# Patient Record
Sex: FEMALE | Race: OTHER | URBAN - METROPOLITAN AREA
[De-identification: names, ages, dates, MRNs, and addresses within clinical notes are randomized per-mention and may not be internally consistent; named-entity substitution may affect disease eponyms.]

---

## 2023-06-16 ENCOUNTER — OFFICE VISIT (OUTPATIENT)
Dept: OTOLARYNGOLOGY | Facility: CLINIC | Age: 83
End: 2023-06-16

## 2023-06-16 VITALS — WEIGHT: 150 LBS | BODY MASS INDEX: 29.45 KG/M2 | HEIGHT: 60 IN

## 2023-06-16 DIAGNOSIS — R05.3 CHRONIC COUGH: Primary | ICD-10-CM

## 2023-06-16 DIAGNOSIS — R49.0 HOARSENESS: ICD-10-CM

## 2023-06-16 DIAGNOSIS — J34.89 NASAL CRUSTING: ICD-10-CM

## 2023-06-16 PROCEDURE — 99203 OFFICE O/P NEW LOW 30 MIN: CPT | Performed by: SPECIALIST

## 2023-06-16 PROCEDURE — 1160F RVW MEDS BY RX/DR IN RCRD: CPT | Performed by: SPECIALIST

## 2023-06-16 PROCEDURE — 3008F BODY MASS INDEX DOCD: CPT | Performed by: SPECIALIST

## 2023-06-16 PROCEDURE — 1159F MED LIST DOCD IN RCRD: CPT | Performed by: SPECIALIST

## 2023-06-16 RX ORDER — ALPRAZOLAM 1 MG/1
1 TABLET ORAL NIGHTLY PRN
COMMUNITY
Start: 2023-05-24

## 2023-06-16 RX ORDER — CEPHALEXIN 500 MG/1
500 CAPSULE ORAL EVERY 8 HOURS
Qty: 30 CAPSULE | Refills: 0 | Status: SHIPPED | OUTPATIENT
Start: 2023-06-16

## 2023-06-16 RX ORDER — ATORVASTATIN CALCIUM 20 MG/1
20 TABLET, FILM COATED ORAL DAILY
COMMUNITY
Start: 2023-01-16

## 2023-06-16 NOTE — PATIENT INSTRUCTIONS
No evidence of laryngeal abnormality on your examination  Please increase water intake for the mucus. Trial of Keflex  Follow-up if symptoms persist  Follow-up with any additional questions or problems.

## 2025-03-04 ENCOUNTER — OFFICE VISIT (OUTPATIENT)
Dept: FAMILY MEDICINE CLINIC | Facility: CLINIC | Age: 85
End: 2025-03-04

## 2025-03-04 VITALS
HEIGHT: 60 IN | TEMPERATURE: 98 F | RESPIRATION RATE: 20 BRPM | OXYGEN SATURATION: 95 % | SYSTOLIC BLOOD PRESSURE: 137 MMHG | DIASTOLIC BLOOD PRESSURE: 72 MMHG | BODY MASS INDEX: 28.86 KG/M2 | HEART RATE: 80 BPM | WEIGHT: 147 LBS

## 2025-03-04 DIAGNOSIS — R73.03 PREDIABETES: Primary | ICD-10-CM

## 2025-03-04 DIAGNOSIS — F41.9 ANXIETY: ICD-10-CM

## 2025-03-04 DIAGNOSIS — M25.562 LEFT KNEE PAIN, UNSPECIFIED CHRONICITY: ICD-10-CM

## 2025-03-04 LAB
CARTRIDGE EXPIRATION DATE: ABNORMAL DATE
HEMOGLOBIN A1C: 5.9 % (ref 4.3–5.6)

## 2025-03-04 PROCEDURE — 1160F RVW MEDS BY RX/DR IN RCRD: CPT | Performed by: FAMILY MEDICINE

## 2025-03-04 PROCEDURE — 1170F FXNL STATUS ASSESSED: CPT | Performed by: FAMILY MEDICINE

## 2025-03-04 PROCEDURE — 3075F SYST BP GE 130 - 139MM HG: CPT | Performed by: FAMILY MEDICINE

## 2025-03-04 PROCEDURE — 1159F MED LIST DOCD IN RCRD: CPT | Performed by: FAMILY MEDICINE

## 2025-03-04 PROCEDURE — 83036 HEMOGLOBIN GLYCOSYLATED A1C: CPT | Performed by: FAMILY MEDICINE

## 2025-03-04 PROCEDURE — 3008F BODY MASS INDEX DOCD: CPT | Performed by: FAMILY MEDICINE

## 2025-03-04 PROCEDURE — 1126F AMNT PAIN NOTED NONE PRSNT: CPT | Performed by: FAMILY MEDICINE

## 2025-03-04 PROCEDURE — 3078F DIAST BP <80 MM HG: CPT | Performed by: FAMILY MEDICINE

## 2025-03-04 PROCEDURE — 99203 OFFICE O/P NEW LOW 30 MIN: CPT | Performed by: FAMILY MEDICINE

## 2025-03-04 RX ORDER — OMEPRAZOLE 40 MG/1
40 CAPSULE, DELAYED RELEASE ORAL DAILY
COMMUNITY
Start: 2024-10-17

## 2025-03-04 RX ORDER — OXYBUTYNIN CHLORIDE 5 MG/1
5 TABLET, EXTENDED RELEASE ORAL DAILY
COMMUNITY
Start: 2024-11-22

## 2025-03-04 RX ORDER — ERGOCALCIFEROL 1.25 MG/1
1 CAPSULE ORAL AS DIRECTED
COMMUNITY

## 2025-03-04 RX ORDER — CLONAZEPAM 2 MG/1
2 TABLET ORAL NIGHTLY PRN
COMMUNITY
Start: 2025-02-05 | End: 2025-03-04

## 2025-03-04 RX ORDER — CLONAZEPAM 2 MG/1
2 TABLET ORAL 2 TIMES DAILY PRN
Qty: 60 TABLET | Refills: 2 | Status: SHIPPED | OUTPATIENT
Start: 2025-03-04

## 2025-03-04 RX ORDER — ACETAMINOPHEN AND CODEINE PHOSPHATE 300; 30 MG/1; MG/1
1-2 TABLET ORAL EVERY 8 HOURS PRN
COMMUNITY
Start: 2024-10-16

## 2025-03-04 RX ORDER — FAMOTIDINE 20 MG/1
1 TABLET, FILM COATED ORAL DAILY PRN
COMMUNITY
End: 2025-03-04

## 2025-03-05 NOTE — PROGRESS NOTES
Subjective:   Patient ID: Ana Hernández is a 85 year old female.    HPI  Here to establish care   Overall doing well   Has issues with left knee and would like to see physiatrist   Did receive injections and it helped though a lot   Has hard time sleeping and takes clonazepam fr that   History/Other:   Review of Systems    Constitutional: Negative.  Negative for activity change, appetite change, diaphoresis and fatigue.     Respiratory: Negative.  Negative for apnea, cough, chest tightness and shortness of breath.    Cardiovascular: Negative.  Negative for chest pain, palpitations and leg swelling.   Gastrointestinal: Negative.  Negative for abdominal pain.   Skin: Negative.           MSK see hpi   Current Outpatient Medications   Medication Sig Dispense Refill    acetaminophen-codeine 300-30 MG Oral Tab Take 1-2 tablets by mouth every 8 (eight) hours as needed for Pain.      Omeprazole 40 MG Oral Capsule Delayed Release Take 1 capsule (40 mg total) by mouth daily.      oxybutynin ER 5 MG Oral Tablet 24 Hr Take 1 tablet (5 mg total) by mouth daily.      clonazePAM 2 MG Oral Tab Take 1 tablet (2 mg total) by mouth 2 (two) times daily as needed. AT BEDTIME 60 tablet 2    Ascorbic Acid 53 MG Mouth/Throat Lozenge Take by mouth daily.      ergocalciferol 1.25 MG (72328 UT) Oral Cap Take 1 capsule (50,000 Units total) by mouth As Directed.       Allergies:Allergies[1]    Objective:   Physical Exam  Constitutional:       Appearance: Normal appearance.   Cardiovascular:      Rate and Rhythm: Normal rate and regular rhythm.      Pulses: Normal pulses.      Heart sounds: Normal heart sounds.   Pulmonary:      Effort: Pulmonary effort is normal.      Breath sounds: Normal breath sounds.   Musculoskeletal:         General: Tenderness present.      Cervical back: Normal range of motion.   Neurological:      Mental Status: She is alert.         Assessment & Plan:   1. Prediabetes    2. Anxiety    3. Left knee pain, unspecified  chronicity    To see physiatrist   Diet and exercise discussed   F/u in 3 months     Orders Placed This Encounter   Procedures    POC Hemoglobin A1C    Basic Metabolic Panel (8) [E]    Urine Culture, Routine       Meds This Visit:  Requested Prescriptions     Signed Prescriptions Disp Refills    clonazePAM 2 MG Oral Tab 60 tablet 2     Sig: Take 1 tablet (2 mg total) by mouth 2 (two) times daily as needed. AT BEDTIME       Imaging & Referrals:  PHYSIATRY - INTERNAL         [1] No Known Allergies

## 2025-03-08 ENCOUNTER — LAB ENCOUNTER (OUTPATIENT)
Dept: LAB | Age: 85
End: 2025-03-08
Attending: FAMILY MEDICINE
Payer: MEDICARE

## 2025-03-08 LAB
ANION GAP SERPL CALC-SCNC: 9 MMOL/L (ref 0–18)
BUN BLD-MCNC: 23 MG/DL (ref 9–23)
BUN/CREAT SERPL: 19.8 (ref 10–20)
CALCIUM BLD-MCNC: 9.5 MG/DL (ref 8.7–10.4)
CHLORIDE SERPL-SCNC: 103 MMOL/L (ref 98–112)
CO2 SERPL-SCNC: 29 MMOL/L (ref 21–32)
CREAT BLD-MCNC: 1.16 MG/DL
EGFRCR SERPLBLD CKD-EPI 2021: 46 ML/MIN/1.73M2 (ref 60–?)
FASTING STATUS PATIENT QL REPORTED: YES
GLUCOSE BLD-MCNC: 108 MG/DL (ref 70–99)
OSMOLALITY SERPL CALC.SUM OF ELEC: 296 MOSM/KG (ref 275–295)
POTASSIUM SERPL-SCNC: 4.7 MMOL/L (ref 3.5–5.1)
SODIUM SERPL-SCNC: 141 MMOL/L (ref 136–145)

## 2025-03-08 PROCEDURE — 80048 BASIC METABOLIC PNL TOTAL CA: CPT | Performed by: FAMILY MEDICINE

## 2025-03-08 PROCEDURE — 87086 URINE CULTURE/COLONY COUNT: CPT | Performed by: FAMILY MEDICINE

## 2025-03-08 PROCEDURE — 36415 COLL VENOUS BLD VENIPUNCTURE: CPT | Performed by: FAMILY MEDICINE

## 2025-03-12 ENCOUNTER — TELEPHONE (OUTPATIENT)
Dept: FAMILY MEDICINE CLINIC | Facility: CLINIC | Age: 85
End: 2025-03-12

## 2025-03-12 NOTE — TELEPHONE ENCOUNTER
Daughter Kathy (GLYNN) calling concerned about mothers test results. States she is extremely worried about mothers declining kidney function. Would like to know what she can do to improve kidney function? We discussed lifestyle changes. Also wants to know if clonazepam could be cause of kidney deterioration. Would like further recommendations.

## 2025-03-13 ENCOUNTER — NURSE TRIAGE (OUTPATIENT)
Dept: FAMILY MEDICINE CLINIC | Facility: CLINIC | Age: 85
End: 2025-03-13

## 2025-03-13 NOTE — TELEPHONE ENCOUNTER
Patient's daughter returned call and was notified with understanding.  See separate triage encounter.

## 2025-03-13 NOTE — TELEPHONE ENCOUNTER
Please reply to pool: EM RN TRIAGE  Action Requested: Summary for Provider     []  Critical Lab, Recommendations Needed  [] Need Additional Advice  [x]   FYI    []   Need Orders  [] Need Medications Sent to Pharmacy  []  Other     SUMMARY: Patient's daughter contacts clinic and was notified of Dr. Dodge's recommendations regarding her kidney function. She goes on to report that mother has had pain in her sides for several months.  It travels from right to left and back.  Has been treated in the past for urinary tract infection. Currently denies fever, body aches or chills, recent urine culture negative, but would like this pain further evaluated.  She did not discuss it at her recent visit.  Inquires on referral to specialist.  Follow up scheduled to re-evaluate.  Report any progression of symptoms prior or go to emergency room for severe symptoms.  Daughter verbalized understanding and compliance.     Reason for call: Flank Pain  Onset: Data Unavailable                       Reason for Disposition   MILD pain (i.e., scale 1-3; does not interfere with normal activities) and present > 3 days    Protocols used: Flank Pain-A-OH

## 2025-03-13 NOTE — TELEPHONE ENCOUNTER
Allison Dodge MD Jackson, Qiana, RN; Em Rn Triage7 hours ago (10:52 PM)       Please call her  -should avoid nsaids first of all  Walking and any exercise is good  We need to repeat the test in 3-4 months  Unfortunately this is part of aging  If it continues to decline should send her to see nephrologist

## 2025-03-18 ENCOUNTER — OFFICE VISIT (OUTPATIENT)
Dept: FAMILY MEDICINE CLINIC | Facility: CLINIC | Age: 85
End: 2025-03-18

## 2025-03-18 VITALS
HEART RATE: 70 BPM | HEIGHT: 60 IN | RESPIRATION RATE: 20 BRPM | OXYGEN SATURATION: 96 % | TEMPERATURE: 98 F | SYSTOLIC BLOOD PRESSURE: 130 MMHG | DIASTOLIC BLOOD PRESSURE: 67 MMHG | BODY MASS INDEX: 29.06 KG/M2 | WEIGHT: 148 LBS

## 2025-03-18 DIAGNOSIS — M54.59 OTHER LOW BACK PAIN: Primary | ICD-10-CM

## 2025-03-18 PROBLEM — N18.30 CKD (CHRONIC KIDNEY DISEASE) STAGE 3, GFR 30-59 ML/MIN (HCC): Chronic | Status: ACTIVE | Noted: 2025-03-18

## 2025-03-18 PROCEDURE — 1125F AMNT PAIN NOTED PAIN PRSNT: CPT | Performed by: FAMILY MEDICINE

## 2025-03-18 PROCEDURE — 3008F BODY MASS INDEX DOCD: CPT | Performed by: FAMILY MEDICINE

## 2025-03-18 PROCEDURE — 1159F MED LIST DOCD IN RCRD: CPT | Performed by: FAMILY MEDICINE

## 2025-03-18 PROCEDURE — 99214 OFFICE O/P EST MOD 30 MIN: CPT | Performed by: FAMILY MEDICINE

## 2025-03-18 PROCEDURE — 3075F SYST BP GE 130 - 139MM HG: CPT | Performed by: FAMILY MEDICINE

## 2025-03-18 PROCEDURE — 1160F RVW MEDS BY RX/DR IN RCRD: CPT | Performed by: FAMILY MEDICINE

## 2025-03-18 PROCEDURE — 3078F DIAST BP <80 MM HG: CPT | Performed by: FAMILY MEDICINE

## 2025-03-18 NOTE — PROGRESS NOTES
Subjective:   Patient ID: Ana Hernández is a 85 year old female.    HPI  Here for f/u blood test   Has stage 4 sharon;l insuficiencyt   Also complains about low back pain lately   Does not take much nsaids just occasionally   History/Other:   Review of Systems   Constitutional: Negative.  Negative for fatigue.   Respiratory:  Negative for cough, chest tightness and shortness of breath.    Cardiovascular: Negative.    Musculoskeletal:  Positive for back pain. Negative for gait problem, joint swelling and myalgias.         Current Outpatient Medications   Medication Sig Dispense Refill    Ascorbic Acid 53 MG Mouth/Throat Lozenge Take by mouth daily.      acetaminophen-codeine 300-30 MG Oral Tab Take 1-2 tablets by mouth every 8 (eight) hours as needed for Pain.      ergocalciferol 1.25 MG (80989 UT) Oral Cap Take 1 capsule (50,000 Units total) by mouth As Directed.      Omeprazole 40 MG Oral Capsule Delayed Release Take 1 capsule (40 mg total) by mouth daily.      oxybutynin ER 5 MG Oral Tablet 24 Hr Take 1 tablet (5 mg total) by mouth daily.      clonazePAM 2 MG Oral Tab Take 1 tablet (2 mg total) by mouth 2 (two) times daily as needed. AT BEDTIME 60 tablet 2    Calcium Polycarbophil 625 MG Oral Chew Tab Chew by mouth daily.       Allergies:Allergies[1]    Objective:   Physical Exam  Constitutional:       Appearance: She is well-developed.   Cardiovascular:      Rate and Rhythm: Normal rate and regular rhythm.      Heart sounds: Normal heart sounds.   Pulmonary:      Effort: Pulmonary effort is normal. No respiratory distress.      Breath sounds: Normal breath sounds. No wheezing or rales.   Abdominal:      General: There is no distension.      Palpations: Abdomen is soft.      Tenderness: There is no abdominal tenderness.   Musculoskeletal:         General: Tenderness present. No deformity.      Lumbar back: Spasms and tenderness present. Decreased range of motion.   Neurological:      Mental Status: She is alert.       Motor: No abnormal muscle tone.      Deep Tendon Reflexes: Reflexes are normal and symmetric.         Assessment & Plan:   1. Other low back pain    Appears msk   Will start physical therapy   2. Renal insuf   To stop nsaids completely   Repeat bmp in 3 months do US kidneys     No orders of the defined types were placed in this encounter.      Meds This Visit:  Requested Prescriptions      No prescriptions requested or ordered in this encounter       Imaging & Referrals:  PHYSICAL THERAPY - INTERNAL  US KIDNEYS (CPT=[id number])         [1] No Known Allergies

## 2025-03-21 ENCOUNTER — TELEPHONE (OUTPATIENT)
Dept: PHYSICAL THERAPY | Facility: HOSPITAL | Age: 85
End: 2025-03-21

## 2025-04-03 ENCOUNTER — HOSPITAL ENCOUNTER (OUTPATIENT)
Dept: ULTRASOUND IMAGING | Facility: HOSPITAL | Age: 85
Discharge: HOME | End: 2025-04-03
Attending: FAMILY MEDICINE
Payer: MEDICARE

## 2025-04-03 DIAGNOSIS — M54.59 OTHER LOW BACK PAIN: ICD-10-CM

## 2025-04-03 PROCEDURE — 76775 US EXAM ABDO BACK WALL LIM: CPT | Performed by: FAMILY MEDICINE

## 2025-04-09 ENCOUNTER — TELEPHONE (OUTPATIENT)
Dept: FAMILY MEDICINE CLINIC | Facility: CLINIC | Age: 85
End: 2025-04-09

## 2025-04-09 NOTE — TELEPHONE ENCOUNTER
Daughter, Kathy, on release of information calling for Patient.  Patient had kidney ultrasound done 4/3/25, states the technician told Patient she needs to follow up with Dr. Dodge.  Daughter would like to verify this information.  Daughter informed of conclusion.  Informed daughter, will verify with Dr. Dodge is follow up appointment is needed. Per daughter, low back pain comes and goes.  Aware of lab results from 3/9/25.  Please advise.    CONCLUSION:      No hydronephrosis or nephrolithiasis.

## 2025-04-11 ENCOUNTER — TELEPHONE (OUTPATIENT)
Dept: PHYSICAL THERAPY | Facility: HOSPITAL | Age: 85
End: 2025-04-11

## 2025-04-11 NOTE — TELEPHONE ENCOUNTER
Spoke with daughter, verified name and date of birth.  Informed of results and Dr. Dodge's message and verbalized understanding.    Allison Dodge MD to Me (Selected Message)      4/10/25  3:52 AM  I mean she needs to see me if the pain continues to see what we can do as far as results of US they are good  Allison Dodge MD  4/9/2025  1:56 PM CDT       Normal us kidneys please call her

## 2025-04-17 ENCOUNTER — OFFICE VISIT (OUTPATIENT)
Dept: PHYSICAL THERAPY | Facility: HOSPITAL | Age: 85
End: 2025-04-17
Attending: FAMILY MEDICINE
Payer: MEDICARE

## 2025-04-17 DIAGNOSIS — M54.59 OTHER LOW BACK PAIN: Primary | ICD-10-CM

## 2025-04-17 PROCEDURE — 97110 THERAPEUTIC EXERCISES: CPT

## 2025-04-17 PROCEDURE — 97161 PT EVAL LOW COMPLEX 20 MIN: CPT

## 2025-04-17 NOTE — PROGRESS NOTES
SPINE EVALUATION:     Diagnosis:   Other low back pain (M54.59) Patient:  Ana Hernández (85 year old, female)        Referring Provider: Allison Dodge  Today's Date   4/17/2025    Precautions:  None   Date of Evaluation: 04/17/25  Next MD visit: TBD  Date of Surgery: No data recorded     PATIENT SUMMARY    used: [id number]    Summary of chief complaints: low back pain.  History of current condition: Pt reports pain wrapping around back to abdominal area. Pain began 2 months ago. She reported to her doctor. Pt reports doctor told her it is musculature pain. Reports pain is dull. Has a hard time distinguishing what activites bother her back, but reports moving and exercises feels good.   Pain level: current 6 /10, at best 4 /10, at worst 9 /10  Description of symptoms: Dull   Occupation: Retired  Lives in apartment w/ elevator in senior care community. Independent with household chores, cooking and bathing.   Leisure activities/Hobbies: Exercise, Walking, Step/dance class   Prior level of function: No pain in low back and indepedent with ADL's  Current limitations: prolonged walking, bending  Pt goals: To decrease pain  Red flag signs/symptoms: Pt denies changes in bowel/bladder function, saddle anesthesia; Pt denies dizziness, drop attacks, dysphagia, dysarthria, diplopia; Pt denies pain that wakes in sleep, fever, recent trauma, history of CA, pain unchanged with movement/activity    Past medical history was reviewed with Ana.    Imaging/Tests: None   Ana  has a past medical history of Esophageal reflux, Hyperlipidemia, and Insomnia.  She  has a past surgical history that includes cataract.    ASSESSMENT  Ana presents to physical therapy evaluation with primary c/o low back pain.. The results of the objective tests and measures show Weakness, TTP, ROM deficits.. Functional deficits include but are not limited to prolonged walking, bending. Signs and symptoms are consistent with diagnosis of  Other low back pain (M54.59). Pt and PT discussed evaluation findings, pathology, POC and HEP.  Pt voiced understanding and performs HEP correctly without reported pain. Skilled Physical Therapy is medically necessary to address the above impairments and reach functional goals.    OBJECTIVE:    Musculoskeletal:  Observation/Posture: posterior pelvic tilt (Sits with rounded posture)   Palpation: TTP: Bilateral thoracic and lumbar paraspinals     ROM and Strength:  (* denotes performed with pain)  Trunk ROM     Flex 70*     Ext 20    R L     Side bend 15* 10*     Rotation 100% wnl 100% wnl   ,   Hip   MMT (-/5)    R L     Flex (L2) 4* 4*     Ext  3-* 3-*     Abd 3-* 3-*     ER 5 5     IR 5 4    ,   Knee   MMT (-/5)    R L     Flex 5 5     Ext (L3) 5 5     ,     Flexibility:  LE Flexibility R L     Hip Flexor WNL WNL     Hamstrings WNL WNL     Piriformis min restricted (*) WNL     Quads WNL WNL        Balance and Functional Mobility:  Gait: pt ambulates on level ground with normal mechanics.       Today's Treatment and Response:   Pt education was provided on exam findings, treatment diagnosis, treatment plan, expectations, and prognosis.  Today's Treatment       4/17/2025   Spine Treatment   Therapeutic Exercise - LTR x 10 B  - SKTC in H/L 10 sec x 5   - BKFO x 10 B   Therapeutic Exercise Minutes 10   Evaluation Minutes 40   Total Time Of Timed Procedures 10   Total Time Of Service-Based Procedures 40   Total Treatment Time 50   HEP Access Code: [email or web address]  URL: [email or web address]  Date: 04/17/2025  Prepared by: Dick Rhodes    Exercises  - Hooklying Single Knee to Chest  - 1 x daily - 7 x weekly - 1 sets - 10 reps - 10-15 sec hold  - Supine Lumbar Rotation  - 1 x daily - 7 x weekly - 2 sets - 10 reps - 5 sec hold  - Bent Knee Fallouts  - 1 x daily - 7 x weekly - 2 sets - 10 reps - 3 sec hold        Patient was instructed in and issued a HEP for: Access Code: [email or web address]  URL:  [email or web address]  Date: 04/17/2025  Prepared by: Dick Rhodes    Exercises  - Hooklying Single Knee to Chest  - 1 x daily - 7 x weekly - 1 sets - 10 reps - 10-15 sec hold  - Supine Lumbar Rotation  - 1 x daily - 7 x weekly - 2 sets - 10 reps - 5 sec hold  - Bent Knee Fallouts  - 1 x daily - 7 x weekly - 2 sets - 10 reps - 3 sec hold    Charges:  PT EVAL: Low Complexity, 1 TE  In agreement with evaluation findings and clinical rationale, this evaluation involved LOW COMPLEXITY decision making due to no personal factors/comorbidities, 1-2 body structures involved/activity limitations, and stable symptoms as documented in the evaluation.                                                                         PLAN OF CARE:    Goals: (to be met in 8 visits)    Not Met Progress Toward Partially Met Met   Pt will improve transversus abdominis recruitment to perform proper isometric contraction without requiring verbal or tactile cuing to promote advancement of therex. [] [] [] []   Pt will demonstrate good understanding of proper posture and body mechanics to decrease pain and improve spinal safety. [] [] [] []   Pt will improve lumbar spine AROM flexion to 70 deg pain-free to allow increase ease with bending forward to don shoes. [] [] [] []   Pt will improve hip ABD and ER strength by 1/2 to 1 muscle grade to increase ease with standing and walking [] [] [] []   Pt will have decreased paraspinal mm tension to tolerate standing >30 minutes for work and home activities. [] [] [] []   Pt will demonstrate improved core strength to be able to perform walking with <4/10 pain. [] [] [] []   Pt will be independent and compliant with comprehensive HEP to maintain progress achieved in PT [] [] [] []         Frequency / Duration: Patient will be seen 1-2x/week or a total of 8  visits over a 90 day period. Treatment will include: Gait training; Manual Therapy; Neuromuscular Re-education; Home Exercise Program  instruction; Therapeutic Exercise; Therapeutic Activities    Education or treatment limitation: None   Rehab Potential: good     Oswestry Disability Index Score  Score: (Patient-Rptd) 40 % (4/17/2025 11:12 AM)      Patient/Family/Caregiver was advised of these findings, precautions, and treatment options and has agreed to actively participate in planning and for this course of care.    Thank you for your referral. Please co-sign or sign and return this letter via fax as soon as possible to [phone]. If you have any questions, please contact me at Dept: [phone]    Sincerely,  Electronically signed by therapist: Dick Rhodes PT  Physician's certification required: Yes  I certify the need for these services furnished under this plan of treatment and while under my care.    X___________________________________________________ Date____________________    Certification From: 4/17/2025  To:7/16/2025

## 2025-04-29 ENCOUNTER — OFFICE VISIT (OUTPATIENT)
Dept: PHYSICAL THERAPY | Facility: HOSPITAL | Age: 85
End: 2025-04-29
Attending: FAMILY MEDICINE
Payer: MEDICARE

## 2025-04-29 PROCEDURE — 97112 NEUROMUSCULAR REEDUCATION: CPT

## 2025-04-29 PROCEDURE — 97110 THERAPEUTIC EXERCISES: CPT

## 2025-04-29 PROCEDURE — 97140 MANUAL THERAPY 1/> REGIONS: CPT

## 2025-04-29 NOTE — PROGRESS NOTES
Patient: Ana Hernández (85 year old, female) Referring Provider:  Insurance:   Diagnosis: Other low back pain (M54.59) Allison ROY [facility]   Date of Surgery: No data recorded Next MD visit:  N/A   Precautions:  None TBD Referral Information:    Date of Evaluation: Req: 8, Auth: 8, Exp: 25 POC Auth Visits:  8       Today's Date   2025    Subjective  Patient reports her back hurts on bilateral low back, but she reports she has not had strong pain. ( used: [id number])       Pain: 7/10 (Beginnin/10 low back pain. End: 4/10 low back pain)     Objective  L paraspinal tone > than R       Trunk       2025   Trunk ROM/MMT   Trunk Flex 70*   Trunk Extension 20   Trunk Rt Side Bend 15*   Trunk Lt Side Bend 10*   Trunk Rt Rotation 100% wnl   Trunk Lt Rotation 100% wnl   , Hip       2025   Hip ROM/MMT   Rt Hip Flexion MMT (L2) 4*   Lt Hip Flexion MMT (L2) 4*   Rt Hip Extension MMT 3-*   Lt Hip Extension MMT 3-*   Rt Hip Abduction MMT 3-*   Lt Hip Abduction MMT 3-*   Rt Hip ER MMT 5   Lt Hip ER MMT 5   Rt Hip IR MMT 5   Lt Hip IR MMT 4        Assessment  Session focused on beginning stages of TrA recruit. Pt has a difficult time performing accurately, occasional ability to perform properly will continue to work on in future sessions. L PS tone greater than R. Pt reports lower pain reportings at the end of therapy, noted above.    Goals (to be met in 8 visits)      Not Met Progress Toward Partially Met Met   Pt will improve transversus abdominis recruitment to perform proper isometric contraction without requiring verbal or tactile cuing to promote advancement of therex. [] [] [] []   Pt will demonstrate good understanding of proper posture and body mechanics to decrease pain and improve spinal safety. [] [] [] []   Pt will improve lumbar spine AROM flexion to 70 deg pain-free to allow increase ease with bending forward to don shoes. [] [] [] []   Pt will improve hip ABD and ER  strength by 1/2 to 1 muscle grade to increase ease with standing and walking [] [] [] []   Pt will have decreased paraspinal mm tension to tolerate standing >30 minutes for work and home activities. [] [] [] []   Pt will demonstrate improved core strength to be able to perform walking with <4/10 pain. [] [] [] []   Pt will be independent and compliant with comprehensive HEP to maintain progress achieved in PT [] [] [] []             Plan  Continue with TrA recruitment.    Treatment Last 4 Visits  Treatment Day: 2       4/17/2025 4/29/2025   Spine Treatment   Therapeutic Exercise - LTR x 10 B  - SKTC in H/L 10 sec x 5   - BKFO x 10 B - SBDKTC x 20   - LTR x 20   - BKFO RTB x 15 B  - Clamshells x 20 B  - SB rollouts fwd 5 sec x 10   - SB rollouts lat 5 sec x 10 B   Neuro Re-Education  - TrA activation x 15 (Tactile cueing needed throughout)   - Hip abd iso into pilates ring 5 sec x 10   - Bridges x 15      Manual Therapy  - STM: B TPS and LPS    Therapeutic Exercise Minutes 10 20   Neuro Re-Educ Minutes  12   Manual Therapy Minutes  10   Evaluation Minutes 40    Total Time Of Timed Procedures 10 42   Total Time Of Service-Based Procedures 40 0   Total Treatment Time 50 42   HEP Access Code: [email or web address]  URL: [email or web address]  Date: 04/17/2025  Prepared by: Dick Rhodes    Exercises  - Hooklying Single Knee to Chest  - 1 x daily - 7 x weekly - 1 sets - 10 reps - 10-15 sec hold  - Supine Lumbar Rotation  - 1 x daily - 7 x weekly - 2 sets - 10 reps - 5 sec hold  - Bent Knee Fallouts  - 1 x daily - 7 x weekly - 2 sets - 10 reps - 3 sec hold         HEP  Access Code: [email or web address]  URL: [email or web address]  Date: 04/17/2025  Prepared by: Dick Rhodes    Exercises  - Hooklying Single Knee to Chest  - 1 x daily - 7 x weekly - 1 sets - 10 reps - 10-15 sec hold  - Supine Lumbar Rotation  - 1 x daily - 7 x weekly - 2 sets - 10 reps - 5 sec hold  - Bent Knee Fallouts  - 1 x daily - 7 x weekly  - 2 sets - 10 reps - 3 sec hold    Charges  1 TE: 1 NRE: 1 MT

## 2025-05-01 ENCOUNTER — OFFICE VISIT (OUTPATIENT)
Dept: PHYSICAL THERAPY | Facility: HOSPITAL | Age: 85
End: 2025-05-01
Attending: FAMILY MEDICINE
Payer: MEDICARE

## 2025-05-01 PROCEDURE — 97110 THERAPEUTIC EXERCISES: CPT

## 2025-05-01 PROCEDURE — 97140 MANUAL THERAPY 1/> REGIONS: CPT

## 2025-05-01 PROCEDURE — 97112 NEUROMUSCULAR REEDUCATION: CPT

## 2025-05-01 NOTE — PROGRESS NOTES
Patient: Ana Hernández (85 year old, female) Referring Provider:  Insurance:   Diagnosis: Other low back pain (M54.59) Allison ROY [facility]   Date of Surgery: No data recorded Next MD visit:  N/A   Precautions:  None TBD Referral Information:    Date of Evaluation: Req: 8, Auth: 8, Exp: 7/17/2025 04/17/25    POC Auth Visits:  8       used: [id number]     Today's Date   5/1/2025    Subjective  Pt reports her back is feeling better. Denies soreness after PT services.       Pain: 6/10     Objective  L paraspinal tone > than R            Assessment  Pt demonstrates improved particaiption in TrA recruitment today. Fair contraction demonstrated. Continued L PS tone greater than R. Continue to incorporate TrA based exercises to limit load on lumbar spine.    Goals (to be met in 8 visits)      Not Met Progress Toward Partially Met Met   Pt will improve transversus abdominis recruitment to perform proper isometric contraction without requiring verbal or tactile cuing to promote advancement of therex. [] [] [] []   Pt will demonstrate good understanding of proper posture and body mechanics to decrease pain and improve spinal safety. [] [] [] []   Pt will improve lumbar spine AROM flexion to 70 deg pain-free to allow increase ease with bending forward to don shoes. [] [] [] []   Pt will improve hip ABD and ER strength by 1/2 to 1 muscle grade to increase ease with standing and walking [] [] [] []   Pt will have decreased paraspinal mm tension to tolerate standing >30 minutes for work and home activities. [] [] [] []   Pt will demonstrate improved core strength to be able to perform walking with <4/10 pain. [] [] [] []   Pt will be independent and compliant with comprehensive HEP to maintain progress achieved in PT [] [] [] []                 Plan  Add: Standing hip abduction    Treatment Last 4 Visits  Treatment Day: 3       4/17/2025 4/29/2025 5/1/2025   Spine Treatment   Therapeutic Exercise - LTR x 10 B  -  SKTC in H/L 10 sec x 5   - BKFO x 10 B - SBDKTC x 20   - LTR x 20   - BKFO RTB x 15 B  - Clamshells x 20 B  - SB rollouts fwd 5 sec x 10   - SB rollouts lat 5 sec x 10 B - SBDKTC x 20  - Clamshells x 20 B  - SB rollouts fwd 5 sec x 10  - SB rollouts lat 5 sec x 10 B       Neuro Re-Education  - TrA activation x 15 (Tactile cueing needed throughout)   - Hip abd iso into pilates ring 5 sec x 10   - Bridges x 15    - TrA activation 3 sec x 15   - Bridges x 15  - TrA activation with hip abd 3 sec x 15   - Pallof press YTB 3 sec x 8 B     Manual Therapy  - STM: B TPS and LPS    - STM: B TPS and LPS       Therapeutic Exercise Minutes 10 20 15   Neuro Re-Educ Minutes  12 15   Manual Therapy Minutes  10 10   Evaluation Minutes 40     Total Time Of Timed Procedures 10 42 40   Total Time Of Service-Based Procedures 40 0 0   Total Treatment Time 50 42 40   HEP Access Code: [email or web address]  URL: [email or web address]  Date: 04/17/2025  Prepared by: Dick Rhodes    Exercises  - Hooklying Single Knee to Chest  - 1 x daily - 7 x weekly - 1 sets - 10 reps - 10-15 sec hold  - Supine Lumbar Rotation  - 1 x daily - 7 x weekly - 2 sets - 10 reps - 5 sec hold  - Bent Knee Fallouts  - 1 x daily - 7 x weekly - 2 sets - 10 reps - 3 sec hold          HEP  Access Code: [email or web address]  URL: [email or web address]  Date: 04/17/2025  Prepared by: Dick Rhodes    Exercises  - Hooklying Single Knee to Chest  - 1 x daily - 7 x weekly - 1 sets - 10 reps - 10-15 sec hold  - Supine Lumbar Rotation  - 1 x daily - 7 x weekly - 2 sets - 10 reps - 5 sec hold  - Bent Knee Fallouts  - 1 x daily - 7 x weekly - 2 sets - 10 reps - 3 sec hold    Charges  1 TE: 1 NRE: 1 MT

## 2025-05-05 ENCOUNTER — OFFICE VISIT (OUTPATIENT)
Dept: PHYSICAL THERAPY | Facility: HOSPITAL | Age: 85
End: 2025-05-05
Attending: FAMILY MEDICINE
Payer: MEDICARE

## 2025-05-05 PROCEDURE — 97112 NEUROMUSCULAR REEDUCATION: CPT

## 2025-05-05 PROCEDURE — 97110 THERAPEUTIC EXERCISES: CPT

## 2025-05-05 PROCEDURE — 97140 MANUAL THERAPY 1/> REGIONS: CPT

## 2025-05-05 NOTE — PROGRESS NOTES
Patient: Aan Hernández (85 year old, female) Referring Provider:  Insurance:   Diagnosis: Other low back pain (M54.59) Allison ROY MCR   Date of Surgery: No data recorded Next MD visit:  N/A   Precautions:  None TBD Referral Information:    Date of Evaluation: Req: 8, Auth: 8, Exp: 7/17/2025 04/17/25  POC Auth Visits:  8       Today's Date   5/5/2025    Subjective  Pt reports her back is doing much better. Pt reports no longer has pain into abdominal area. ( used: ID [id number])       Pain: 5/10     Objective  Decreases paraspinal tone bilaterally       Trunk       4/17/2025 5/5/2025   Trunk ROM/MMT   Trunk Flex 70* 70   Trunk Extension 20 20   Trunk Rt Side Bend 15* 15*   Trunk Lt Side Bend 10* 15*   Trunk Rt Rotation 100% wnl 100% wnl   Trunk Lt Rotation 100% wnl 100% wnl        Assessment  Decreased bilateral paraspinal toned noted today. Progression of hip and core stabilization and ROM exercises as well, pt requires tactile and verbal cueing to perform exercises properly, but reports no pain. Pt is able to complete TrA requirment without tactile cueing today, only requires verbal. Discussion of POC. Advisd pt if she would like to continue to schedule out avoid gap in care. Pt verbalized understanding.    Goals (to be met in 8 visits)      Not Met Progress Toward Partially Met Met   Pt will improve transversus abdominis recruitment to perform proper isometric contraction without requiring verbal or tactile cuing to promote advancement of therex. [] [x] [] []   Pt will demonstrate good understanding of proper posture and body mechanics to decrease pain and improve spinal safety. [] [x] [] []   Pt will improve lumbar spine AROM flexion to 70 deg pain-free to allow increase ease with bending forward to don shoes. [] [] [] [x]   Pt will improve hip ABD and ER strength by 1/2 to 1 muscle grade to increase ease with standing and walking [] [x] [] []   Pt will have decreased paraspinal mm tension to  tolerate standing >30 minutes for work and home activities. [] [x] [] []   Pt will demonstrate improved core strength to be able to perform walking with <4/10 pain. [] [x] [] []   Pt will be independent and compliant with comprehensive HEP to maintain progress achieved in PT [] [] [] [x]           Plan  Add: Standing hip abduction and extension without glider.    Treatment Last 4 Visits  Treatment Day: 4       4/17/2025 4/29/2025 5/1/2025 5/5/2025   Spine Treatment   Therapeutic Exercise - LTR x 10 B  - SKTC in H/L 10 sec x 5   - BKFO x 10 B - SBDKTC x 20   - LTR x 20   - BKFO RTB x 15 B  - Clamshells x 20 B  - SB rollouts fwd 5 sec x 10   - SB rollouts lat 5 sec x 10 B - SBDKTC x 20  - Clamshells x 20 B  - SB rollouts fwd 5 sec x 10  - SB rollouts lat 5 sec x 10 B     Clamshells RTB x 15 (B)  Glider hip abd x 20   Glider hip extension x 20   POC discussion   Neuro Re-Education  - TrA activation x 15 (Tactile cueing needed throughout)   - Hip abd iso into pilates ring 5 sec x 10   - Bridges x 15    - TrA activation 3 sec x 15   - Bridges x 15  - TrA activation with hip abd 3 sec x 15   - Pallof press YTB 3 sec x 8 B   - TrA activation 3 sec x 10  - TrA activation with hip abd 5 sec x 15  - BKFO RTB x 20  -       Manual Therapy  - STM: B TPS and LPS    - STM: B TPS and LPS     - STM: B TPS and LPS   Therapeutic Exercise Minutes 10 20 15 20   Neuro Re-Educ Minutes  12 15 15   Manual Therapy Minutes  10 10 10   Evaluation Minutes 40      Total Time Of Timed Procedures 10 42 40 45   Total Time Of Service-Based Procedures 40 0 0 0   Total Treatment Time 50 42 40 45   HEP Access Code: [email or web address]  URL: [email or web address]  Date: 04/17/2025  Prepared by: Dick Rhodes    Exercises  - Hooklying Single Knee to Chest  - 1 x daily - 7 x weekly - 1 sets - 10 reps - 10-15 sec hold  - Supine Lumbar Rotation  - 1 x daily - 7 x weekly - 2 sets - 10 reps - 5 sec hold  - Bent Knee Fallouts  - 1 x daily - 7 x weekly -  2 sets - 10 reps - 3 sec hold           HEP  Access Code: [email or web address]  URL: [email or web address]  Date: 04/17/2025  Prepared by: Dick Rhodes    Exercises  - Hooklying Single Knee to Chest  - 1 x daily - 7 x weekly - 1 sets - 10 reps - 10-15 sec hold  - Supine Lumbar Rotation  - 1 x daily - 7 x weekly - 2 sets - 10 reps - 5 sec hold  - Bent Knee Fallouts  - 1 x daily - 7 x weekly - 2 sets - 10 reps - 3 sec hold    Charges  1 TE: 1 MT: 1 NRE

## 2025-05-08 ENCOUNTER — OFFICE VISIT (OUTPATIENT)
Dept: PHYSICAL THERAPY | Facility: HOSPITAL | Age: 85
End: 2025-05-08
Attending: FAMILY MEDICINE
Payer: MEDICARE

## 2025-05-08 PROCEDURE — 97110 THERAPEUTIC EXERCISES: CPT

## 2025-05-08 PROCEDURE — 97140 MANUAL THERAPY 1/> REGIONS: CPT

## 2025-05-08 NOTE — PROGRESS NOTES
Patient: Ana Hernández (85 year old, female) Referring Provider:  Insurance:   Diagnosis: Other low back pain (M54.59) Allison ROY MCR   Date of Surgery: No data recorded Next MD visit:  N/A   Precautions:  None TBD Referral Information:    Date of Evaluation: Req: 8, Auth: 8, Exp: 7/17/2025    No data recorded POC Auth Visits:  8       Today's Date   5/8/2025    Subjective  Pt reports she is feeling better. She reports her pain is about a 4/10. She reports when she is laying supine she is pain-free but when she is sitting pain increases to a 4/10. She reports pain is not as bad as it was when she first began therapy. ( used: [id number])       Pain: 4/10     Objective  See table below          Trunk       4/17/2025 5/5/2025   Trunk ROM/MMT   Trunk Flex 70* 70   Trunk Extension 20 20   Trunk Rt Side Bend 15* 15*   Trunk Lt Side Bend 10* 15*   Trunk Rt Rotation 100% wnl 100% wnl   Trunk Lt Rotation 100% wnl 100% wnl            Assessment  Discussed scheduling out a few more visits, but pt declined due to transportation. Progressed hip strengthening exercises today pain-free and with good form. Also added new lumbar mobility based exercises to improve lumbar PS flexibility, slight increase in restrictions on the L, but overall pt able to participate in well.    Goals (to be met in 8 visits)      Not Met Progress Toward Partially Met Met   Pt will improve transversus abdominis recruitment to perform proper isometric contraction without requiring verbal or tactile cuing to promote advancement of therex. [] [x] [] []   Pt will demonstrate good understanding of proper posture and body mechanics to decrease pain and improve spinal safety. [] [x] [] []   Pt will improve lumbar spine AROM flexion to 70 deg pain-free to allow increase ease with bending forward to don shoes. [] [] [] [x]   Pt will improve hip ABD and ER strength by 1/2 to 1 muscle grade to increase ease with standing and walking [] [x] [] []    Pt will have decreased paraspinal mm tension to tolerate standing >30 minutes for work and home activities. [] [x] [] []   Pt will demonstrate improved core strength to be able to perform walking with <4/10 pain. [] [x] [] []   Pt will be independent and compliant with comprehensive HEP to maintain progress achieved in PT [] [] [] [x]               Plan  Potential discharge next session    Treatment Last 4 Visits  Treatment Day: 5 4/29/2025 5/1/2025 5/5/2025 5/8/2025   Spine Treatment   Therapeutic Exercise - SBDKTC x 20   - LTR x 20   - BKFO RTB x 15 B  - Clamshells x 20 B  - SB rollouts fwd 5 sec x 10   - SB rollouts lat 5 sec x 10 B - SBDKTC x 20  - Clamshells x 20 B  - SB rollouts fwd 5 sec x 10  - SB rollouts lat 5 sec x 10 B     Clamshells RTB x 15 (B)  Glider hip abd x 20   Glider hip extension x 20   POC discussion Glider arcs x 15 (B)  Standing SB x 10 (B)  Standing hip abduction x 20 (B)  Standing hip extension x 20 (B)  Lumbar and thoracic rotation on the wall x 10 (B)   Neuro Re-Education - TrA activation x 15 (Tactile cueing needed throughout)   - Hip abd iso into pilates ring 5 sec x 10   - Bridges x 15    - TrA activation 3 sec x 15   - Bridges x 15  - TrA activation with hip abd 3 sec x 15   - Pallof press YTB 3 sec x 8 B   - TrA activation 3 sec x 10  - TrA activation with hip abd 5 sec x 15  - BKFO RTB x 20  -     TrA w/ step outs x 5 (B)  Chopping YTB x 10 (B)     Manual Therapy - STM: B TPS and LPS    - STM: B TPS and LPS     - STM: B TPS and LPS STM: B PS and QL   Therapeutic Exercise Minutes 20 15 20 25   Neuro Re-Educ Minutes 12 15 15 7   Manual Therapy Minutes 10 10 10 10   Total Time Of Timed Procedures 42 40 45 42   Total Time Of Service-Based Procedures 0 0 0 0   Total Treatment Time 42 40 45 42        HEP  Access Code: [email or web address]  URL: [email or web address]  Date: 04/17/2025  Prepared by: Dick Rhodes    Exercises  - Hooklying Single Knee to Chest  - 1 x daily - 7  x weekly - 1 sets - 10 reps - 10-15 sec hold  - Supine Lumbar Rotation  - 1 x daily - 7 x weekly - 2 sets - 10 reps - 5 sec hold  - Bent Knee Fallouts  - 1 x daily - 7 x weekly - 2 sets - 10 reps - 3 sec hold    Charges  2 TE: 1 MT

## 2025-05-12 ENCOUNTER — OFFICE VISIT (OUTPATIENT)
Dept: PHYSICAL THERAPY | Facility: HOSPITAL | Age: 85
End: 2025-05-12
Attending: FAMILY MEDICINE
Payer: MEDICARE

## 2025-05-12 PROCEDURE — 97112 NEUROMUSCULAR REEDUCATION: CPT

## 2025-05-12 PROCEDURE — 97110 THERAPEUTIC EXERCISES: CPT

## 2025-05-12 NOTE — PROGRESS NOTES
Discharge Summary  Pt has attended 6 visits in Physical Therapy.    Patient: Ana Hernández (85 year old, female) Referring Provider:  Insurance:   Diagnosis: Other low back pain (M54.59) Allison ROY [facility]   Date of Surgery: No data recorded Next MD visit:  N/A   Precautions:  None TBD Referral Information:    Date of Evaluation: Req: 8, Auth: 8, Exp: 7/17/2025    No data recorded POC Auth Visits:  8       Today's Date   5/12/2025    Subjective  Pt reports she feels better. She reports she is able to do her daily activites independently. She reports she is only having pain occasionally, but when she has it, it is much lower. She was able to ambulate 5 floors today. ( used: [id number])       Pain: 1/10     Objective  See table below      PS muscles mobility WNL     Trunk       4/17/2025 5/5/2025   Trunk ROM/MMT   Trunk Flex 70* 70   Trunk Extension 20 20   Trunk Rt Side Bend 15* 15*   Trunk Lt Side Bend 10* 15*   Trunk Rt Rotation 100% wnl 100% wnl   Trunk Lt Rotation 100% wnl 100% wnl   , Hip       4/17/2025 5/12/2025   Hip ROM/MMT   Rt Hip Flexion MMT (L2) 4* 5   Lt Hip Flexion MMT (L2) 4* 5   Rt Hip Extension MMT 3-* 4-   Lt Hip Extension MMT 3-* 3+   Rt Hip Abduction MMT 3-* 3+   Lt Hip Abduction MMT 3-* 3+   Rt Hip ER MMT 5 5   Lt Hip ER MMT 5 5   Rt Hip IR MMT 5 5   Lt Hip IR MMT 4 5   , Knee       4/17/2025 5/12/2025   Knee ROM/MMT   Rt Knee Flexion MMT 5 5   Lt Knee Flexion MMT 5 5   Rt Knee Extension MMT (L3) 5 5   Lt Knee Extension MMT (L3) 5 5        Assessment  Pt demonstrates a decrease in lumbar paraspinal tension, WNL flex and extension of the lumbar and improved lower extremity strength. Patient states she is ready to discharge. Pt is discharging her HEP. Educated pt on progress made, and use HEP. Instructed to pick and choose a few exercises to participate in from HEP. Does not have to participate in all exercises each day. Pt verbalized understanding and is agreeable to  discharge.    Goals (to be met in 8 visits)      Not Met Progress Toward Partially Met Met   Pt will improve transversus abdominis recruitment to perform proper isometric contraction without requiring verbal or tactile cuing to promote advancement of therex. [] [] [] [x]   Pt will demonstrate good understanding of proper posture and body mechanics to decrease pain and improve spinal safety. [] [] [] [x]   Pt will improve lumbar spine AROM flexion to 70 deg pain-free to allow increase ease with bending forward to don shoes. [] [] [] [x]   Pt will improve hip ABD and ER strength by 1/2 to 1 muscle grade to increase ease with standing and walking [] [] [] [x]   Pt will have decreased paraspinal mm tension to tolerate standing >30 minutes for work and home activities. [] [] [] [x]   Pt will demonstrate improved core strength to be able to perform walking with <4/10 pain. [] [] [] [x]   Pt will be independent and compliant with comprehensive HEP to maintain progress achieved in PT [] [] [] [x]       Plan  Discharge with HEP.  Post Oswestry Disability Index Score  Post Score: 16 % (5/12/2025 10:52 AM)    24 % improvement      Patient/Family/Caregiver was advised of these findings, precautions, and treatment options and has agreed to actively participate in planning and for this course of care.    Thank you for your referral. If you have any questions, please contact me at Dept: [phone].    Sincerely,  Electronically signed by therapist: Dick Rhodes PT     Physician's certification required:  No  Please co-sign or sign and return this letter via fax as soon as possible to [phone].   I certify the need for these services furnished under this plan of treatment and while under my care.    X___________________________________________________ Date____________________    Certification From: 5/12/2025  To:8/10/2025      Treatment Last 4 Visits  Treatment Day: 6       5/1/2025 5/5/2025 5/8/2025 5/12/2025   Spine  Treatment   Therapeutic Exercise - SBDKTC x 20  - Clamshells x 20 B  - SB rollouts fwd 5 sec x 10  - SB rollouts lat 5 sec x 10 B     Clamshells RTB x 15 (B)  Glider hip abd x 20   Glider hip extension x 20   POC discussion Glider arcs x 15 (B)  Standing SB x 10 (B)  Standing hip abduction x 20 (B)  Standing hip extension x 20 (B)  Lumbar and thoracic rotation on the wall x 10 (B) Glider arcs x 15 (B)   Standing hip abduction x 15 (B)   Standing hip extension x 15 (B)   Side stepping YTB 10 ft x 4  Reassessment, POC discussion, HEP update, progress made discussion.    Neuro Re-Education - TrA activation 3 sec x 15   - Bridges x 15  - TrA activation with hip abd 3 sec x 15   - Pallof press YTB 3 sec x 8 B   - TrA activation 3 sec x 10  - TrA activation with hip abd 5 sec x 15  - BKFO RTB x 20  -     TrA w/ step outs x 5 (B)  Chopping YTB x 10 (B)   TrA w/ step outs RTB x 5 (B)   Chopping RTB x 10 (B)   Palloff press RTB 5 sec x 10    Manual Therapy   - STM: B TPS and LPS     - STM: B TPS and LPS STM: B PS and QL    Therapeutic Exercise Minutes 15 20 25 25   Neuro Re-Educ Minutes 15 15 7 15   Manual Therapy Minutes 10 10 10    Total Time Of Timed Procedures 40 45 42 40   Total Time Of Service-Based Procedures 0 0 0 0   Total Treatment Time 40 45 42 40   HEP    Access Code: [email or web address]  URL: [email or web address]  Date: 05/12/2025  Prepared by: Dick Rhodes    Exercises  - Hooklying Single Knee to Chest  - 1 x daily - 7 x weekly - 1 sets - 10 reps - 10-15 sec hold  - Supine Lumbar Rotation  - 1 x daily - 7 x weekly - 2 sets - 10 reps - 5 sec hold  - Bent Knee Fallouts  - 1 x daily - 7 x weekly - 2 sets - 10 reps - 3 sec hold  - Standing Hip Abduction with Counter Support  - 1 x daily - 7 x weekly - 2 sets - 10 reps  - Standing Hip Extension with Counter Support  - 1 x daily - 7 x weekly - 2 sets - 10 reps  - Side Stepping with Resistance at Ankles and Counter Support  - 1 x daily - 7 x weekly - 2 sets -  10 reps  - Supine Posterior Pelvic Tilt  - 1 x daily - 7 x weekly - 2 sets - 10 reps - 3 sec hold  - Standing Anti-Rotation Press with Anchored Resistance  - 1 x daily - 7 x weekly - 1 sets - 10 reps - 5 sec hold  - Clamshell  - 1 x daily - 7 x weekly - 2 sets - 10 reps  - Supine Double Knee to Chest  - 1 x daily - 7 x weekly - 1-2 sets - 10 reps - 5 sec hold        HEP  Access Code: [email or web address]  URL: [email or web address]  Date: 05/12/2025  Prepared by: Dick Rhodes    Exercises  - Hooklying Single Knee to Chest  - 1 x daily - 7 x weekly - 1 sets - 10 reps - 10-15 sec hold  - Supine Lumbar Rotation  - 1 x daily - 7 x weekly - 2 sets - 10 reps - 5 sec hold  - Bent Knee Fallouts  - 1 x daily - 7 x weekly - 2 sets - 10 reps - 3 sec hold  - Standing Hip Abduction with Counter Support  - 1 x daily - 7 x weekly - 2 sets - 10 reps  - Standing Hip Extension with Counter Support  - 1 x daily - 7 x weekly - 2 sets - 10 reps  - Side Stepping with Resistance at Ankles and Counter Support  - 1 x daily - 7 x weekly - 2 sets - 10 reps  - Supine Posterior Pelvic Tilt  - 1 x daily - 7 x weekly - 2 sets - 10 reps - 3 sec hold  - Standing Anti-Rotation Press with Anchored Resistance  - 1 x daily - 7 x weekly - 1 sets - 10 reps - 5 sec hold  - Clamshell  - 1 x daily - 7 x weekly - 2 sets - 10 reps  - Supine Double Knee to Chest  - 1 x daily - 7 x weekly - 1-2 sets - 10 reps - 5 sec hold    Charges  2 TE: 1 NRE

## 2025-05-28 ENCOUNTER — TELEPHONE (OUTPATIENT)
Dept: FAMILY MEDICINE CLINIC | Facility: CLINIC | Age: 85
End: 2025-05-28

## 2025-06-14 ENCOUNTER — HOSPITAL ENCOUNTER (OUTPATIENT)
Dept: GENERAL RADIOLOGY | Age: 85
Discharge: HOME | End: 2025-06-14
Attending: NURSE PRACTITIONER
Payer: MEDICARE

## 2025-06-14 ENCOUNTER — OFFICE VISIT (OUTPATIENT)
Dept: FAMILY MEDICINE CLINIC | Facility: CLINIC | Age: 85
End: 2025-06-14

## 2025-06-14 VITALS
WEIGHT: 141.63 LBS | DIASTOLIC BLOOD PRESSURE: 73 MMHG | BODY MASS INDEX: 27.8 KG/M2 | HEIGHT: 60 IN | SYSTOLIC BLOOD PRESSURE: 120 MMHG | HEART RATE: 75 BPM

## 2025-06-14 DIAGNOSIS — M25.512 ACUTE PAIN OF LEFT SHOULDER: ICD-10-CM

## 2025-06-14 DIAGNOSIS — H91.90 HEARING LOSS, UNSPECIFIED HEARING LOSS TYPE, UNSPECIFIED LATERALITY: ICD-10-CM

## 2025-06-14 DIAGNOSIS — M25.551 RIGHT HIP PAIN: ICD-10-CM

## 2025-06-14 DIAGNOSIS — R21 FACIAL RASH: ICD-10-CM

## 2025-06-14 DIAGNOSIS — M25.551 RIGHT HIP PAIN: Primary | ICD-10-CM

## 2025-06-14 DIAGNOSIS — N18.31 STAGE 3A CHRONIC KIDNEY DISEASE (HCC): ICD-10-CM

## 2025-06-14 PROCEDURE — 73502 X-RAY EXAM HIP UNI 2-3 VIEWS: CPT | Performed by: NURSE PRACTITIONER

## 2025-06-14 PROCEDURE — 73030 X-RAY EXAM OF SHOULDER: CPT | Performed by: NURSE PRACTITIONER

## 2025-06-14 NOTE — PROGRESS NOTES
HPI    Patient presents for concerns of right hip pain.  Recently had kidney ultrasound in March which checked out normal.  Patient with ongoing concerns since low GFR.  Would like referral to nephrology for assessment and treatment.  Also with concerns of left shoulder pain for the past few weeks.  Denies injury.    With history of facial rash/lesions across nose.  Reports that she was previously using a cream which was prescribed by dermatology but unsure of the name.  Would like referral to dermatology for reassessment and treatment.    Also with concerns of decreased hearing.  Previously had ears flushed and cerumen removed.  Would like referral for reassessment.    Review of Systems   HENT:  Positive for hearing loss.    Musculoskeletal:         Right hip, left shoulder pain.   Skin:  Positive for rash.   All other systems reviewed and are negative.       Vitals:    06/14/25 1108   BP: 120/73   Pulse: 75   Weight: 141 lb 9.6 oz (64.2 kg)   Height: 5' (1.524 m)     Body mass index is 27.65 kg/m².    Health Maintenance   Topic Date Due    Pneumococcal Vaccine: 50+ Years (1 of 1 - PCV) Never done    Zoster Vaccines (1 of 2) Never done    DEXA Scan  Never done    COVID-19 Vaccine (4 - 2024-25 season) 09/01/2024    Annual Well Visit  Never done    Influenza Vaccine (Season Ended) 10/01/2025    Annual Depression Screening  Completed    Fall Risk Screening (Annual)  Completed    Meningococcal B Vaccine  Aged Out       Past Medical History[1]    .Past Surgical History[2]    Family History[3]    Social History     Socioeconomic History    Marital status:      Spouse name: Not on file    Number of children: Not on file    Years of education: Not on file    Highest education level: Not on file   Occupational History    Not on file   Tobacco Use    Smoking status: Former     Types: Cigarettes    Smokeless tobacco: Never   Vaping Use    Vaping status: Never Used   Substance and Sexual Activity    Alcohol use: Never     Drug use: Never    Sexual activity: Not on file   Other Topics Concern    Not on file   Social History Narrative    Not on file     Social Drivers of Health     Food Insecurity: Not on file   Transportation Needs: Not on file   Stress: Not on file   Housing Stability: Not on file       Current Medications[4]    Allergies:  Allergies[5]    Physical Exam  Vitals and nursing note reviewed.   Constitutional:       General: She is not in acute distress.     Appearance: Normal appearance.   Cardiovascular:      Rate and Rhythm: Normal rate and regular rhythm.      Heart sounds: Normal heart sounds.   Pulmonary:      Effort: Pulmonary effort is normal. No respiratory distress.      Breath sounds: Normal breath sounds. No stridor. No wheezing, rhonchi or rales.   Chest:      Chest wall: No tenderness.   Musculoskeletal:      Left shoulder: Tenderness present. Decreased range of motion.      Right hip: Tenderness present.   Skin:     Findings: Rash (bridge of nose) present.   Neurological:      Mental Status: She is alert and oriented to person, place, and time.          Assessment and Plan:   Problem List Items Addressed This Visit          [facility] Problems    CKD (chronic kidney disease) stage 3, GFR 30-59 ml/min ([facility]) (Chronic)    Relevant Orders    Nephrology Referral - In Network     Other Visit Diagnoses         Right hip pain    -  Primary    Relevant Orders    XR HIP + PELVIS MIN 4 VIEWS RIGHT (CPT=73503)      Hearing loss, unspecified hearing loss type, unspecified laterality        Relevant Orders    ENT - INTERNAL    Audiology Referral - In Network      Facial rash        Relevant Orders    Derm Referral - In Network      Acute pain of left shoulder        Relevant Orders    XR SHOULDER, COMPLETE (MIN 2 VIEWS), LEFT (CPT=73030)           Right hip x-ray today.  Left shoulder x-ray today.  Referral to nephrology.  Referral to dermatology.  Referral to ENT/audiology.    Discussed plan of care with patient and patient  is in agreement.  All questions answered. Patient to call with questions or concerns.    Encouraged to sign up for My Chart if not already registered.        [1]   Past Medical History:   Esophageal reflux    Hyperlipidemia    Insomnia   [2]   Past Surgical History:  Procedure Laterality Date    Cataract     [3] History reviewed. No pertinent family history.  [4]   Current Outpatient Medications   Medication Sig Dispense Refill    Calcium Polycarbophil 625 MG Oral Chew Tab Chew by mouth daily.      Ascorbic Acid 53 MG Mouth/Throat Lozenge Take by mouth daily.      acetaminophen-codeine 300-30 MG Oral Tab Take 1-2 tablets by mouth every 8 (eight) hours as needed for Pain.      ergocalciferol 1.25 MG (31731 UT) Oral Cap Take 1 capsule (50,000 Units total) by mouth As Directed.      Omeprazole 40 MG Oral Capsule Delayed Release Take 1 capsule (40 mg total) by mouth daily.      oxybutynin ER 5 MG Oral Tablet 24 Hr Take 1 tablet (5 mg total) by mouth daily.      clonazePAM 2 MG Oral Tab Take 1 tablet (2 mg total) by mouth 2 (two) times daily as needed. AT BEDTIME 60 tablet 2   [5] No Known Allergies

## 2025-08-29 DIAGNOSIS — F41.9 ANXIETY: ICD-10-CM

## 2025-08-30 ENCOUNTER — OFFICE VISIT (OUTPATIENT)
Dept: FAMILY MEDICINE CLINIC | Facility: CLINIC | Age: 85
End: 2025-08-30

## 2025-08-30 VITALS
BODY MASS INDEX: 27.06 KG/M2 | HEIGHT: 60 IN | SYSTOLIC BLOOD PRESSURE: 106 MMHG | HEART RATE: 71 BPM | DIASTOLIC BLOOD PRESSURE: 70 MMHG | WEIGHT: 137.81 LBS

## 2025-08-30 DIAGNOSIS — K21.9 GASTROESOPHAGEAL REFLUX DISEASE WITHOUT ESOPHAGITIS: ICD-10-CM

## 2025-08-30 DIAGNOSIS — Z12.83 SKIN EXAM FOR MALIGNANT NEOPLASM: ICD-10-CM

## 2025-08-30 DIAGNOSIS — Z23 NEED FOR VACCINATION: ICD-10-CM

## 2025-08-30 DIAGNOSIS — Z13.6 SCREENING FOR CARDIOVASCULAR CONDITION: ICD-10-CM

## 2025-08-30 DIAGNOSIS — Z00.00 ENCOUNTER FOR ANNUAL HEALTH EXAMINATION: Primary | ICD-10-CM

## 2025-08-30 DIAGNOSIS — Z13.1 SCREENING FOR DIABETES MELLITUS (DM): ICD-10-CM

## 2025-08-30 DIAGNOSIS — Z78.0 POSTMENOPAUSAL: ICD-10-CM

## 2025-08-30 DIAGNOSIS — N18.30 STAGE 3 CHRONIC KIDNEY DISEASE, UNSPECIFIED WHETHER STAGE 3A OR 3B CKD (HCC): Chronic | ICD-10-CM

## 2025-08-30 DIAGNOSIS — R73.03 PREDIABETES: ICD-10-CM

## 2025-08-30 DIAGNOSIS — R21 FACIAL RASH: ICD-10-CM

## 2025-08-30 DIAGNOSIS — L82.1 SEBORRHEIC KERATOSES: ICD-10-CM

## 2025-08-30 DIAGNOSIS — N39.41 URGE INCONTINENCE OF URINE: ICD-10-CM

## 2025-08-30 DIAGNOSIS — F41.9 ANXIETY: ICD-10-CM

## 2025-08-30 RX ORDER — CLONAZEPAM 2 MG/1
2 TABLET ORAL 2 TIMES DAILY PRN
Qty: 60 TABLET | Refills: 0 | Status: SHIPPED | OUTPATIENT
Start: 2025-08-30

## 2025-08-30 RX ORDER — OXYBUTYNIN CHLORIDE 5 MG/1
5 TABLET, EXTENDED RELEASE ORAL DAILY
Qty: 90 TABLET | Refills: 1 | Status: SHIPPED | OUTPATIENT
Start: 2025-08-30

## 2025-08-30 RX ORDER — CLONAZEPAM 2 MG/1
2 TABLET ORAL 2 TIMES DAILY PRN
Qty: 60 TABLET | Refills: 0 | OUTPATIENT
Start: 2025-08-30

## (undated) NOTE — LETTER
[address] [place]  [address]  Phone: [phone]  Fax: [phone]     Roger,    Nirali communicado viene de la [facility], de la jessicaina joseph Pucketto, queremos agradecerle por poner reveles confianza en [facility]. Nuestra meta es darle la mas sravani calidad en cuidado de dereck y un experiencia excepcional jeffrey paciente de nuestra clinica. La revision de reveles historial medico demuestra que es tiempo de hacer las seguintes exminaciones:      Fisico anual de Medicare      Por Favor de llamar al [phone] para hacer reveles sandeep o puede usar reveles applicacion de [facility] para agendar reveles proxima sandeep.   Si cambio de provedor de dereck, por favor notifique a la clinica para actualizar stefani registros.  Si realizo algun examen en otra clinica, por favor envie stefani resultados por fax a nuestra oficina at [phone].     Everett y le deseamos un son gregg !